# Patient Record
Sex: FEMALE | Race: WHITE | NOT HISPANIC OR LATINO | URBAN - METROPOLITAN AREA
[De-identification: names, ages, dates, MRNs, and addresses within clinical notes are randomized per-mention and may not be internally consistent; named-entity substitution may affect disease eponyms.]

---

## 2020-01-26 ENCOUNTER — EMERGENCY (EMERGENCY)
Facility: HOSPITAL | Age: 65
LOS: 1 days | Discharge: HOME | End: 2020-01-26
Admitting: EMERGENCY MEDICINE
Payer: COMMERCIAL

## 2020-01-26 VITALS
SYSTOLIC BLOOD PRESSURE: 124 MMHG | OXYGEN SATURATION: 100 % | TEMPERATURE: 98 F | RESPIRATION RATE: 16 BRPM | HEIGHT: 64 IN | DIASTOLIC BLOOD PRESSURE: 79 MMHG | HEART RATE: 63 BPM | WEIGHT: 111.99 LBS

## 2020-01-26 PROCEDURE — 12002 RPR S/N/AX/GEN/TRNK2.6-7.5CM: CPT

## 2020-01-26 PROCEDURE — 70450 CT HEAD/BRAIN W/O DYE: CPT | Mod: 26

## 2020-01-26 PROCEDURE — 99284 EMERGENCY DEPT VISIT MOD MDM: CPT | Mod: 25

## 2020-01-26 RX ORDER — TETANUS TOXOID, REDUCED DIPHTHERIA TOXOID AND ACELLULAR PERTUSSIS VACCINE, ADSORBED 5; 2.5; 8; 8; 2.5 [IU]/.5ML; [IU]/.5ML; UG/.5ML; UG/.5ML; UG/.5ML
0.5 SUSPENSION INTRAMUSCULAR ONCE
Refills: 0 | Status: COMPLETED | OUTPATIENT
Start: 2020-01-26 | End: 2020-01-26

## 2020-01-26 RX ADMIN — TETANUS TOXOID, REDUCED DIPHTHERIA TOXOID AND ACELLULAR PERTUSSIS VACCINE, ADSORBED 0.5 MILLILITER(S): 5; 2.5; 8; 8; 2.5 SUSPENSION INTRAMUSCULAR at 10:18

## 2020-01-26 NOTE — ED PROVIDER NOTE - SKIN, MLM
Head: +4cm long Diagonal linear laceration on the occipital scalp, bleeding controlled. Head: +4cm long Diagonal linear laceration on the occipital scalp, bleeding controlled, no foreign body

## 2020-01-26 NOTE — ED ADULT NURSE NOTE - NSIMPLEMENTINTERV_GEN_ALL_ED
Implemented All Universal Safety Interventions:  [place] to call system. Call bell, personal items and telephone within reach. Instruct patient to call for assistance. Room bathroom lighting operational. Non-slip footwear when patient is off stretcher. Physically safe environment: no spills, clutter or unnecessary equipment. Stretcher in lowest position, wheels locked, appropriate side rails in place.

## 2020-01-26 NOTE — ED PROVIDER NOTE - OBJECTIVE STATEMENT
64 y o female with no significant PMHx presents to ED for sustained head injury from fall. Pt was easing into the toilet this morning when she felt dizzy and fell backward. Pt admits to drinking large amounts of wine last night and having back pain prior to fall but is unsure why she fell. Denies LOC. Pt currently endorsing light headedness. States she is unsure what surface she struck her head with but notes that it caused her to have some bleeding. No N/V, headache, numbness, tingling, neck pain, blurred vision, or other sx. Tetanus not UTD.

## 2020-01-26 NOTE — ED ADULT TRIAGE NOTE - CHIEF COMPLAINT QUOTE
Patient reports feeling dizzy while attempting to sit on her toilet , causing her to fall and hit her head. Scalp laceration noted. Denies any LOC

## 2020-01-26 NOTE — ED PROVIDER NOTE - NSFOLLOWUPINSTRUCTIONS_ED_ALL_ED_FT
Follow up with primary care provider.  Hydrate well.    Return for signs of infection:  redness, swelling, drainage, or increased pain.    Your staples should be removed in 7 days.    Return for weakness, numbness, visual changes, vomiting or other concerns.

## 2020-01-26 NOTE — ED PROVIDER NOTE - CLINICAL SUMMARY MEDICAL DECISION MAKING FREE TEXT BOX
Pt presents to ED for sustained head injury after fall. Noted +4cm linear laceration on the occipital lobe of head on exam, no cervical neck tenderness. Discussed with pt plan to r/o more serious head injury risks. Pt refused and stated that she did not want EKG or any lab/blood work done. Discussed with pt the risks of refusing testing to which she expressed understanding. Pt agreeable for CT head imaging. Imaging ordered. Perform lac repair. Pt presents to ED s/p fall, ?syncope. Noted +4cm linear laceration on the occipital lobe of head on exam, no cervical neck tenderness.  Pt refused and stated that she did not want EKG or any labs/ekg/UA. Pt informed of my concern of syncope/ loss of consciousness and the need to r/o underlying cause of initial fall but she continue to refuse.  Pt is aware of risk of recurrent episode, worsening injury or death.  Pt had the opportunity to ask questions. Pt is amenable to ED head and tetanus.  Laceration repaired.  Wound care and return precautions discussed.

## 2020-01-26 NOTE — ED PROVIDER NOTE - MUSCULOSKELETAL, MLM
Spine appears normal, range of motion is not limited, no muscle or joint tenderness. No cervical neck tenderness. Spine appears normal, range of motion is not limited, no muscle or joint tenderness. No midline spinal ttp.

## 2020-01-26 NOTE — ED PROVIDER NOTE - CHPI ED SYMPTOMS NEG
no headache, no nausea, no neck pain, no blurred vision/no tingling/no loss of consciousness/no numbness/no vomiting
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
## 2020-02-01 DIAGNOSIS — R42 DIZZINESS AND GIDDINESS: ICD-10-CM

## 2020-02-01 DIAGNOSIS — Z23 ENCOUNTER FOR IMMUNIZATION: ICD-10-CM

## 2020-02-01 DIAGNOSIS — R55 SYNCOPE AND COLLAPSE: ICD-10-CM

## 2020-02-01 DIAGNOSIS — S01.01XA LACERATION WITHOUT FOREIGN BODY OF SCALP, INITIAL ENCOUNTER: ICD-10-CM
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
## 2021-11-08 NOTE — ED PROVIDER NOTE - PATIENT PORTAL LINK FT
denies You can access the FollowMyHealth Patient Portal offered by [facility] by registering at the following website: [email or web address]. By joining [facility]’s FollowMyHealth portal, you will also be able to view your health information using other applications (apps) compatible with our system.